# Patient Record
Sex: MALE | Race: WHITE | NOT HISPANIC OR LATINO | URBAN - METROPOLITAN AREA
[De-identification: names, ages, dates, MRNs, and addresses within clinical notes are randomized per-mention and may not be internally consistent; named-entity substitution may affect disease eponyms.]

---

## 2019-02-06 ENCOUNTER — EMERGENCY (EMERGENCY)
Facility: HOSPITAL | Age: 69
LOS: 1 days | Discharge: HOME | End: 2019-02-06
Admitting: EMERGENCY MEDICINE
Payer: MEDICARE

## 2019-02-06 VITALS
OXYGEN SATURATION: 99 % | DIASTOLIC BLOOD PRESSURE: 93 MMHG | TEMPERATURE: 98 F | SYSTOLIC BLOOD PRESSURE: 169 MMHG | RESPIRATION RATE: 16 BRPM | HEART RATE: 82 BPM

## 2019-02-06 VITALS
SYSTOLIC BLOOD PRESSURE: 130 MMHG | RESPIRATION RATE: 18 BRPM | DIASTOLIC BLOOD PRESSURE: 79 MMHG | HEART RATE: 60 BPM | OXYGEN SATURATION: 98 %

## 2019-02-06 LAB
APPEARANCE UR: SIGNIFICANT CHANGE UP
BACTERIA # UR AUTO: NEGATIVE — SIGNIFICANT CHANGE UP
BILIRUB UR-MCNC: NEGATIVE — SIGNIFICANT CHANGE UP
BLOOD UR QL VISUAL: HIGH
COLOR SPEC: SIGNIFICANT CHANGE UP
GLUCOSE UR-MCNC: NEGATIVE — SIGNIFICANT CHANGE UP
HYALINE CASTS # UR AUTO: SIGNIFICANT CHANGE UP
KETONES UR-MCNC: SIGNIFICANT CHANGE UP
LEUKOCYTE ESTERASE UR-ACNC: NEGATIVE — SIGNIFICANT CHANGE UP
NITRITE UR-MCNC: NEGATIVE — SIGNIFICANT CHANGE UP
PH UR: 6.5 — SIGNIFICANT CHANGE UP (ref 5–8)
PROT UR-MCNC: 30 — SIGNIFICANT CHANGE UP
RBC CASTS # UR COMP ASSIST: >50 — HIGH (ref 0–?)
SP GR SPEC: 1.01 — SIGNIFICANT CHANGE UP (ref 1–1.04)
SQUAMOUS # UR AUTO: SIGNIFICANT CHANGE UP
UROBILINOGEN FLD QL: NORMAL — SIGNIFICANT CHANGE UP
WBC UR QL: SIGNIFICANT CHANGE UP (ref 0–?)

## 2019-02-06 PROCEDURE — 99283 EMERGENCY DEPT VISIT LOW MDM: CPT | Mod: 25

## 2019-02-06 NOTE — ED PROVIDER NOTE - CHPI ED SYMPTOMS NEG
no vomiting/No chills, SOB, CP, urinary discharge, or testicle pain./no chills/no fever/no nausea/no diarrhea

## 2019-02-06 NOTE — ED ADULT TRIAGE NOTE - CHIEF COMPLAINT QUOTE
Pt c/o inability to urinate for past hour.  Pt had procedure on prostate done and had catheter placed, was removed on Monday.  Pt very uncomfortable in triage, unable to sit.

## 2019-02-06 NOTE — ED PROVIDER NOTE - CONSTITUTIONAL, MLM
normal... Well appearing, well nourished, awake, alert, oriented to person, place, time/situation and in no apparent distress. +Uncomfortable and pacing around the room

## 2019-02-06 NOTE — ED PROVIDER NOTE - OBJECTIVE STATEMENT
67 y/o male with a PMHx of BPM presents to the ED with s/p urinary procedure on 1/30 Rezum catheter until yesterday p/w urinary retention. Pt states he was last able to use the bathroom after he had catheter removed, and has not be able to urinate since. As per Pt pain started at the suprapubic region then radiated to the flanks b/l. Pt denies any fevers, chills, CP, SOB, N/V/D, urinary discharge, or testicle pain. 67 y/o male with a PMHx of BPH s/p urinary procedure called POOL on 1/30, w/ bradley catheter placement after procedure, removed yesterday, p/w urinary retention. Pt states felt sudden onset suprapubic pressure and inability to void this evening. Pt states pain started at the suprapubic region then radiated to the flanks b/l. Pt denies any fevers, chills, CP, SOB, N/V/D, urinary discharge, or testicular pain.

## 2019-02-06 NOTE — ED PROVIDER NOTE - MEDICAL DECISION MAKING DETAILS
Physical exam benign; pressure in subpubic region 67 y/o male s/p urinary procedure will insert catheter completely. Will dc with urinary flow. Pt states he will call his PCP Dr. So tomorrow to obtain an appointment A:  -Acute Urinary retention  P:  -Travis catheter inserted, UA/UC sent,  pt will call his Urologist Dr. So tomorrow to obtain an appointment

## 2019-02-06 NOTE — ED PROVIDER NOTE - CARE PLAN
Principal Discharge DX:	Acute urinary retention  Assessment and plan of treatment:	Advance activity as tolerated.  Continue all previously prescribed medications as directed unless otherwise instructed.  Follow up with your primary care physician in 48-72 hours- bring copies of your results.  Return to the ER for worsening or persistent symptoms, and/or ANY NEW OR CONCERNING SYMPTOMS. If you have issues obtaining follow up, please call: [phone] to obtain a doctor or specialist who takes your insurance in your area.  You may call [phone] to make an appointment with the internal medicine clinic.

## 2019-02-06 NOTE — ED ADULT NURSE NOTE - OBJECTIVE STATEMENT
pt visibly uncomfortable, states he has not been able to urinate for hours and feels the urge and suprapubic pain.  had catheter removed on monday that was placed last week following a prostate procedure.  PA at bedside, bradley catheter placed using sterile procedure, approx 400cc clear yellow urine returned, pt reports immediate relief.

## 2019-02-06 NOTE — ED PROVIDER NOTE - NSFOLLOWUPINSTRUCTIONS_ED_ALL_ED_FT
Advance activity as tolerated.  Continue all previously prescribed medications as directed unless otherwise instructed.  Follow up with your primary care physician in 48-72 hours- bring copies of your results.  Return to the ER for worsening or persistent symptoms, and/or ANY NEW OR CONCERNING SYMPTOMS. If you have issues obtaining follow up, please call: [phone] to obtain a doctor or specialist who takes your insurance in your area.  You may call [phone] to make an appointment with the internal medicine clinic.

## 2019-02-06 NOTE — ED PROVIDER NOTE - PROGRESS NOTE DETAILS
Pt voided >750cc of clear then blood tinged urine, pt feels much better, stable to be discharged, will give admin number to follow up results of the urine. Pt stable to be discharged from the ED, will drive himself home, pt will f/u with his Urologist tomorrow.

## 2019-02-07 LAB
BACTERIA UR CULT: SIGNIFICANT CHANGE UP
SPECIMEN SOURCE: SIGNIFICANT CHANGE UP